# Patient Record
Sex: FEMALE | Race: WHITE | NOT HISPANIC OR LATINO | Employment: OTHER | URBAN - METROPOLITAN AREA
[De-identification: names, ages, dates, MRNs, and addresses within clinical notes are randomized per-mention and may not be internally consistent; named-entity substitution may affect disease eponyms.]

---

## 2021-11-30 ENCOUNTER — HOSPITAL ENCOUNTER (OUTPATIENT)
Facility: CLINIC | Age: 66
Setting detail: OBSERVATION
Discharge: HOME | End: 2021-12-01
Attending: INTERNAL MEDICINE | Admitting: INTERNAL MEDICINE
Payer: MEDICARE

## 2021-11-30 ENCOUNTER — APPOINTMENT (OUTPATIENT)
Dept: GENERAL RADIOLOGY | Facility: CLINIC | Age: 66
End: 2021-11-30
Attending: INTERNAL MEDICINE
Payer: MEDICARE

## 2021-11-30 DIAGNOSIS — R07.9 CHEST PAIN, UNSPECIFIED TYPE: ICD-10-CM

## 2021-11-30 LAB
ANION GAP SERPL CALCULATED.3IONS-SCNC: 5 MMOL/L (ref 3–14)
BASOPHILS # BLD AUTO: 0 10E3/UL (ref 0–0.2)
BASOPHILS NFR BLD AUTO: 0 %
BUN SERPL-MCNC: 29 MG/DL (ref 7–30)
CALCIUM SERPL-MCNC: 9.4 MG/DL (ref 8.5–10.1)
CHLORIDE BLD-SCNC: 107 MMOL/L (ref 94–109)
CO2 SERPL-SCNC: 27 MMOL/L (ref 20–32)
CREAT SERPL-MCNC: 0.87 MG/DL (ref 0.52–1.04)
D DIMER PPP FEU-MCNC: 0.33 UG/ML FEU (ref 0–0.5)
EOSINOPHIL # BLD AUTO: 0.1 10E3/UL (ref 0–0.7)
EOSINOPHIL NFR BLD AUTO: 1 %
ERYTHROCYTE [DISTWIDTH] IN BLOOD BY AUTOMATED COUNT: 12.8 % (ref 10–15)
GFR SERPL CREATININE-BSD FRML MDRD: 69 ML/MIN/1.73M2
GLUCOSE BLD-MCNC: 94 MG/DL (ref 70–99)
HCT VFR BLD AUTO: 39 % (ref 35–47)
HGB BLD-MCNC: 12.6 G/DL (ref 11.7–15.7)
IMM GRANULOCYTES # BLD: 0 10E3/UL
IMM GRANULOCYTES NFR BLD: 0 %
LYMPHOCYTES # BLD AUTO: 1.6 10E3/UL (ref 0.8–5.3)
LYMPHOCYTES NFR BLD AUTO: 30 %
MCH RBC QN AUTO: 31.1 PG (ref 26.5–33)
MCHC RBC AUTO-ENTMCNC: 32.3 G/DL (ref 31.5–36.5)
MCV RBC AUTO: 96 FL (ref 78–100)
MONOCYTES # BLD AUTO: 0.4 10E3/UL (ref 0–1.3)
MONOCYTES NFR BLD AUTO: 7 %
NEUTROPHILS # BLD AUTO: 3.2 10E3/UL (ref 1.6–8.3)
NEUTROPHILS NFR BLD AUTO: 62 %
NRBC # BLD AUTO: 0 10E3/UL
NRBC BLD AUTO-RTO: 0 /100
PLATELET # BLD AUTO: 243 10E3/UL (ref 150–450)
POTASSIUM BLD-SCNC: 3.9 MMOL/L (ref 3.4–5.3)
RBC # BLD AUTO: 4.05 10E6/UL (ref 3.8–5.2)
SARS-COV-2 RNA RESP QL NAA+PROBE: NEGATIVE
SODIUM SERPL-SCNC: 139 MMOL/L (ref 133–144)
TROPONIN I SERPL HS-MCNC: 8 NG/L
TROPONIN I SERPL HS-MCNC: 9 NG/L
WBC # BLD AUTO: 5.2 10E3/UL (ref 4–11)

## 2021-11-30 PROCEDURE — 36415 COLL VENOUS BLD VENIPUNCTURE: CPT | Performed by: INTERNAL MEDICINE

## 2021-11-30 PROCEDURE — C9803 HOPD COVID-19 SPEC COLLECT: HCPCS

## 2021-11-30 PROCEDURE — 93005 ELECTROCARDIOGRAM TRACING: CPT | Mod: 76

## 2021-11-30 PROCEDURE — 85025 COMPLETE CBC W/AUTO DIFF WBC: CPT | Performed by: INTERNAL MEDICINE

## 2021-11-30 PROCEDURE — 71046 X-RAY EXAM CHEST 2 VIEWS: CPT

## 2021-11-30 PROCEDURE — 80061 LIPID PANEL: CPT | Performed by: INTERNAL MEDICINE

## 2021-11-30 PROCEDURE — G0378 HOSPITAL OBSERVATION PER HR: HCPCS

## 2021-11-30 PROCEDURE — 87635 SARS-COV-2 COVID-19 AMP PRB: CPT | Performed by: INTERNAL MEDICINE

## 2021-11-30 PROCEDURE — 99285 EMERGENCY DEPT VISIT HI MDM: CPT | Mod: 25

## 2021-11-30 PROCEDURE — 80048 BASIC METABOLIC PNL TOTAL CA: CPT | Performed by: INTERNAL MEDICINE

## 2021-11-30 PROCEDURE — 84484 ASSAY OF TROPONIN QUANT: CPT | Mod: 91 | Performed by: INTERNAL MEDICINE

## 2021-11-30 PROCEDURE — 85379 FIBRIN DEGRADATION QUANT: CPT | Performed by: INTERNAL MEDICINE

## 2021-11-30 PROCEDURE — 93005 ELECTROCARDIOGRAM TRACING: CPT

## 2021-11-30 RX ORDER — ONDANSETRON 2 MG/ML
4 INJECTION INTRAMUSCULAR; INTRAVENOUS EVERY 6 HOURS PRN
Status: DISCONTINUED | OUTPATIENT
Start: 2021-11-30 | End: 2021-12-01 | Stop reason: HOSPADM

## 2021-11-30 RX ORDER — LEVOTHYROXINE SODIUM 88 UG/1
88 TABLET ORAL
Status: DISCONTINUED | OUTPATIENT
Start: 2021-12-01 | End: 2021-12-01 | Stop reason: HOSPADM

## 2021-11-30 RX ORDER — VENLAFAXINE HYDROCHLORIDE 150 MG/1
150 CAPSULE, EXTENDED RELEASE ORAL
Status: DISCONTINUED | OUTPATIENT
Start: 2021-12-01 | End: 2021-12-01 | Stop reason: HOSPADM

## 2021-11-30 RX ORDER — MIRTAZAPINE 15 MG/1
15 TABLET, FILM COATED ORAL AT BEDTIME
Status: DISCONTINUED | OUTPATIENT
Start: 2021-11-30 | End: 2021-12-01 | Stop reason: HOSPADM

## 2021-11-30 RX ORDER — ASPIRIN 81 MG/1
81 TABLET ORAL DAILY
Status: DISCONTINUED | OUTPATIENT
Start: 2021-11-30 | End: 2021-12-01 | Stop reason: HOSPADM

## 2021-11-30 RX ORDER — ACETAMINOPHEN 650 MG/1
650 SUPPOSITORY RECTAL EVERY 6 HOURS PRN
Status: DISCONTINUED | OUTPATIENT
Start: 2021-11-30 | End: 2021-12-01 | Stop reason: HOSPADM

## 2021-11-30 RX ORDER — BUSPIRONE HYDROCHLORIDE 15 MG/1
15 TABLET ORAL 3 TIMES DAILY
Status: DISCONTINUED | OUTPATIENT
Start: 2021-12-01 | End: 2021-12-01 | Stop reason: HOSPADM

## 2021-11-30 RX ORDER — ONDANSETRON 4 MG/1
4 TABLET, ORALLY DISINTEGRATING ORAL EVERY 6 HOURS PRN
Status: DISCONTINUED | OUTPATIENT
Start: 2021-11-30 | End: 2021-12-01 | Stop reason: HOSPADM

## 2021-11-30 RX ORDER — ACETAMINOPHEN 325 MG/1
650 TABLET ORAL EVERY 6 HOURS PRN
Status: DISCONTINUED | OUTPATIENT
Start: 2021-11-30 | End: 2021-12-01 | Stop reason: HOSPADM

## 2021-11-30 ASSESSMENT — ENCOUNTER SYMPTOMS
FEVER: 0
SHORTNESS OF BREATH: 1
GASTROINTESTINAL NEGATIVE: 1

## 2021-11-30 ASSESSMENT — MIFFLIN-ST. JEOR: SCORE: 1193.6

## 2021-11-30 NOTE — ED TRIAGE NOTES
Pt arrives to ED she reports she took dog out for a walk while walkng she developed pain in center of chest. Pain resolved after sitting down reports discomfort now. No hx of cardiac. Denies nausea vomiting. Yesterday pt felt fatigued had negative covid test.

## 2021-11-30 NOTE — ED PROVIDER NOTES
"  History   Chief Complaint:  Chest Pain       The history is provided by the patient and the spouse.      Sybil Curry is a 66 year old female with history of tobacco use who presents with chest pain.Yesteday Sybil felt more fatigued than usual. She had a Covid-19 test which came back as negative. Patent reports that she was walking her dog this evening around 1700 when she developed central chest pain with shortness of breath lasting about 10-15 minutes. She sat down afterwards which seemed to help her pain. However, the pain continued intermittently and is now causing her discomfort. Denies hematemesis. No GI symptoms. No fever, loss of taste, or anosmia. She has a family history of heart disease. No history of DVT or PE. She is Covid-19 vaccinated.    Review of Systems   Constitutional: Negative for fever.   Respiratory: Positive for shortness of breath.    Cardiovascular: Positive for chest pain.   Gastrointestinal: Negative.    All other systems reviewed and are negative.      Allergies:  No Known Allergies    Medications:  The patient is currently on no regular medications.    Past Medical History:     The patient denies any significant past medical history.    Social History:  Patient accompanied by spouse  PCP: No primary care provider on file.    Physical Exam     Patient Vitals for the past 24 hrs:   BP Temp Temp src Pulse Resp SpO2 Height Weight   11/30/21 1915 133/82 -- -- 61 22 98 % -- --   11/30/21 1706 (!) 158/105 97.7  F (36.5  C) Temporal 86 16 98 % 1.651 m (5' 5\") 65.8 kg (145 lb)       Physical Exam  Constitutional:       Comments: Pleasant and cooperative   HENT:      Mouth/Throat:      Pharynx: No posterior oropharyngeal erythema.   Eyes:      Conjunctiva/sclera: Conjunctivae normal.   Cardiovascular:      Rate and Rhythm: Normal rate and regular rhythm.      Heart sounds: Normal heart sounds.   Pulmonary:      Effort: Pulmonary effort is normal.      Breath sounds: Normal breath sounds. "   Abdominal:      General: Bowel sounds are normal. There is no distension.      Palpations: Abdomen is soft.      Tenderness: There is no abdominal tenderness. There is no guarding or rebound.   Musculoskeletal:         General: Normal range of motion.      Cervical back: Neck supple.   Skin:     General: Skin is warm and dry.   Neurological:      Mental Status: She is alert.         Emergency Department Course   EKG  ECG obtained at 1735, ECG read at 1829  Normal sinus rhythm. Rightward axis. Borderline ECG.   No previous ECG to compare.  Rate 68 bpm. IL interval 132 ms. QRS duration 90 ms. QT/QTc 422/448 ms. P-R-T axes 69 109 70.     ECG  ECG taken at 2012, ECG read at 2054  Sinus tachycardia. Rightward axis. Borderline ECG.   No significant changes as compared to prior, dated 11/30/21.  Rate 58 bpm. IL interval 138 ms. QRS duration 88 ms. QT/QTc 476/467 ms. P-R-T axes 68 96 74.     Imaging:  Chest XR,  PA & LAT   Final Result   IMPRESSION: Negative chest.        Report per radiology    Laboratory:  Labs Ordered and Resulted from Time of ED Arrival to Time of ED Departure   BASIC METABOLIC PANEL - Normal       Result Value    Sodium 139      Potassium 3.9      Chloride 107      Carbon Dioxide (CO2) 27      Anion Gap 5      Urea Nitrogen 29      Creatinine 0.87      Calcium 9.4      Glucose 94      GFR Estimate 69     TROPONIN I - Normal    Troponin I High Sensitivity 8     D DIMER QUANTITATIVE - Normal    D-Dimer Quantitative 0.33     TROPONIN I - Normal    Troponin I High Sensitivity 9     CBC WITH PLATELETS AND DIFFERENTIAL    WBC Count 5.2      RBC Count 4.05      Hemoglobin 12.6      Hematocrit 39.0      MCV 96      MCH 31.1      MCHC 32.3      RDW 12.8      Platelet Count 243      % Neutrophils 62      % Lymphocytes 30      % Monocytes 7      % Eosinophils 1      % Basophils 0      % Immature Granulocytes 0      NRBCs per 100 WBC 0      Absolute Neutrophils 3.2      Absolute Lymphocytes 1.6      Absolute  Monocytes 0.4      Absolute Eosinophils 0.1      Absolute Basophils 0.0      Absolute Immature Granulocytes 0.0      Absolute NRBCs 0.0     COVID-19 VIRUS (CORONAVIRUS) BY PCR   LIPID REFLEX TO DIRECT LDL PANEL        Emergency Department Course:  Reviewed:  I reviewed nursing notes, vitals and past medical history    Assessments/Consults:  ED Course as of 11/30/21 2333 Tue Nov 30, 2021   1802 Obtained history and examined the patient as noted above   1926 Updated the patient   2207 Consulted with Dr. Maldonado, Hospitalist, regarding the patients history and presentation in the emergency department.       Interventions:  Medications   aspirin EC tablet 81 mg (has no administration in time range)       Disposition:  The patient was admitted to the hospital under the care of Dr. Maldonado.     Impression & Plan     CMS Diagnoses: None    Medical Decision Making:    Sybil Curry is a 66 year old female who presents to the emergency department with a worrisome history of exertional chest pain with associated symptoms today.  Her EKG is normal x2 and troponin is negative x2.  No other etiology is definitely apparent.  Given her high risk presentation we will keep her on observation pending further evaluation and management.      Diagnosis:    ICD-10-CM    1. Chest pain, unspecified type  R07.9        Discharge Medications:  New Prescriptions    No medications on file       Scribe Disclosure:  I, Real Gutierrez, am serving as a scribe at 5:41 PM on 11/30/2021 to document services personally performed by Kia Magallanes MD based on my observations and the provider's statements to me.            Kia Magallanes MD  11/30/21 3904

## 2021-12-01 ENCOUNTER — APPOINTMENT (OUTPATIENT)
Dept: CARDIOLOGY | Facility: CLINIC | Age: 66
End: 2021-12-01
Attending: INTERNAL MEDICINE
Payer: MEDICARE

## 2021-12-01 VITALS
HEART RATE: 62 BPM | DIASTOLIC BLOOD PRESSURE: 85 MMHG | RESPIRATION RATE: 16 BRPM | OXYGEN SATURATION: 95 % | WEIGHT: 146.5 LBS | TEMPERATURE: 97.7 F | HEIGHT: 66 IN | BODY MASS INDEX: 23.54 KG/M2 | SYSTOLIC BLOOD PRESSURE: 132 MMHG

## 2021-12-01 LAB
ATRIAL RATE - MUSE: 58 BPM
ATRIAL RATE - MUSE: 68 BPM
CHOLEST SERPL-MCNC: 203 MG/DL
DIASTOLIC BLOOD PRESSURE - MUSE: NORMAL MMHG
DIASTOLIC BLOOD PRESSURE - MUSE: NORMAL MMHG
HDLC SERPL-MCNC: 59 MG/DL
INTERPRETATION ECG - MUSE: NORMAL
INTERPRETATION ECG - MUSE: NORMAL
LDLC SERPL CALC-MCNC: 122 MG/DL
NONHDLC SERPL-MCNC: 144 MG/DL
P AXIS - MUSE: 68 DEGREES
P AXIS - MUSE: 69 DEGREES
PR INTERVAL - MUSE: 132 MS
PR INTERVAL - MUSE: 138 MS
QRS DURATION - MUSE: 88 MS
QRS DURATION - MUSE: 90 MS
QT - MUSE: 422 MS
QT - MUSE: 476 MS
QTC - MUSE: 448 MS
QTC - MUSE: 467 MS
R AXIS - MUSE: 109 DEGREES
R AXIS - MUSE: 96 DEGREES
SYSTOLIC BLOOD PRESSURE - MUSE: NORMAL MMHG
SYSTOLIC BLOOD PRESSURE - MUSE: NORMAL MMHG
T AXIS - MUSE: 70 DEGREES
T AXIS - MUSE: 74 DEGREES
TRIGL SERPL-MCNC: 111 MG/DL
TROPONIN I SERPL HS-MCNC: 10 NG/L
TROPONIN I SERPL HS-MCNC: 8 NG/L
VENTRICULAR RATE- MUSE: 58 BPM
VENTRICULAR RATE- MUSE: 68 BPM

## 2021-12-01 PROCEDURE — 93017 CV STRESS TEST TRACING ONLY: CPT

## 2021-12-01 PROCEDURE — 36415 COLL VENOUS BLD VENIPUNCTURE: CPT | Performed by: INTERNAL MEDICINE

## 2021-12-01 PROCEDURE — 99217 PR OBSERVATION CARE DISCHARGE: CPT | Performed by: PHYSICIAN ASSISTANT

## 2021-12-01 PROCEDURE — 93016 CV STRESS TEST SUPVJ ONLY: CPT | Performed by: INTERNAL MEDICINE

## 2021-12-01 PROCEDURE — G0378 HOSPITAL OBSERVATION PER HR: HCPCS

## 2021-12-01 PROCEDURE — 93350 STRESS TTE ONLY: CPT | Mod: TC

## 2021-12-01 PROCEDURE — 93350 STRESS TTE ONLY: CPT | Mod: 26 | Performed by: INTERNAL MEDICINE

## 2021-12-01 PROCEDURE — 93321 DOPPLER ECHO F-UP/LMTD STD: CPT | Mod: 26 | Performed by: INTERNAL MEDICINE

## 2021-12-01 PROCEDURE — 250N000013 HC RX MED GY IP 250 OP 250 PS 637: Performed by: INTERNAL MEDICINE

## 2021-12-01 PROCEDURE — 93018 CV STRESS TEST I&R ONLY: CPT | Performed by: INTERNAL MEDICINE

## 2021-12-01 PROCEDURE — 93325 DOPPLER ECHO COLOR FLOW MAPG: CPT | Mod: 26 | Performed by: INTERNAL MEDICINE

## 2021-12-01 PROCEDURE — 84484 ASSAY OF TROPONIN QUANT: CPT | Performed by: INTERNAL MEDICINE

## 2021-12-01 RX ORDER — VENLAFAXINE HYDROCHLORIDE 150 MG/1
150 CAPSULE, EXTENDED RELEASE ORAL DAILY
COMMUNITY

## 2021-12-01 RX ORDER — MIRTAZAPINE 15 MG/1
15 TABLET, FILM COATED ORAL AT BEDTIME
COMMUNITY

## 2021-12-01 RX ORDER — TRAZODONE HYDROCHLORIDE 50 MG/1
25-100 TABLET, FILM COATED ORAL AT BEDTIME
COMMUNITY

## 2021-12-01 RX ORDER — LEVOTHYROXINE SODIUM 88 UG/1
88 TABLET ORAL DAILY
COMMUNITY

## 2021-12-01 RX ORDER — BUSPIRONE HYDROCHLORIDE 15 MG/1
15 TABLET ORAL 3 TIMES DAILY
COMMUNITY

## 2021-12-01 RX ADMIN — ASPIRIN 81 MG: 81 TABLET, COATED ORAL at 08:40

## 2021-12-01 RX ADMIN — ASPIRIN 81 MG: 81 TABLET, COATED ORAL at 02:21

## 2021-12-01 ASSESSMENT — MIFFLIN-ST. JEOR: SCORE: 1216.27

## 2021-12-01 NOTE — PROGRESS NOTES
ROOM # 208-1    Living Situation (if not independent, order SW consult): Ind with   Facility name:  :      Activity level at baseline: Ind  Activity level on admit: Ind      Patient registered to observation; given Patient Bill of Rights; given the opportunity to ask questions about observation status and their plan of care.  Patient has been oriented to the observation room, bathroom and call light is in place.    Discussed discharge goals and expectations with patient/family.

## 2021-12-01 NOTE — PROGRESS NOTES
PRIMARY DIAGNOSIS: CHEST PAIN  OUTPATIENT/OBSERVATION GOALS TO BE MET BEFORE DISCHARGE:  1. Ruled out acute coronary syndrome (negative or stable Troponin):  Yes-tropx3 neg  2. Pain Status: Pain free.  3. Appropriate provocative testing performed: No- echo stress test to happen  - Stress Test Procedure: Echo stress  - Interpretation of cardiac rhythm per telemetry tech: SB with hr in upper 40s/50s    4. Cleared by Consultants (if applicable):N/A  5. Return to near baseline physical activity: Yes  Discharge Planner Nurse   Safe discharge environment identified: Yes  Barriers to discharge: Yes-stress test       Entered by: Nicolle Lo 12/01/2021 2:55 AM     Please review provider order for any additional goals.   Nurse to notify provider when observation goals have been met and patient is ready for discharge.  Denies chest pain and associated symptoms. On tele- SB in mid 40s and 50s. Up ind. Pt to have stress test today. SL. Lives with . Trops neg.

## 2021-12-01 NOTE — DISCHARGE INSTRUCTIONS
Unsaturated fat is most healthy    Unsaturated fat is the best fat to add to foods when needed .    Unsaturated fats come from plants. They include olive, canola, peanut, corn, avocado, safflower, and sunflower oils.    Liquid (squeezable) margarine is also mostly unsaturated fat.    In moderate amounts, unsaturated fat can even be good for your heart.          Olive oil is a good source of unsaturated fat.      Saturated fat is less healthy    Try not to eat saturated fat. It raises your blood cholesterol levels.    Most saturated fat comes from animals. Foods such as butter, lard, cheese, cream, whole milk, and fatty cuts of meat are high in saturated fat.    Some oils, such as palm and coconut oils, are also saturated fats.     Trans fat is least healthy    Also try not to eat trans fat whenever possible. Even if it's not listed on the food label, look for it in the ingredients in the form of hydrogenated or partially hydrogenated oils.    This is found in snack foods, shortening, French fries, and stick margarines.     Other ways to add flavor    Sprinkle herbs on fish, chicken, and meat, and in soups.    Try herbs, lemon juice, or flavored vinegar on vegetables.    Add chopped onions, garlic, and peppers to flavor beans and rice.    Try the following ways to cook lean meats, fish, or poultry: bake, broil, roast, stew, or stir-rizvi.

## 2021-12-01 NOTE — H&P
Admitted: 11/30/2021    CHIEF COMPLAINT:  Chest pain.    HISTORY OF PRESENT ILLNESS:  Ivette is a 66-year-old female with a past medical history significant for depression, generalized anxiety disorder, hypothyroidism, who presented to the Emergency Room with a complaint of chest pain.  The patient reported that she was walking her dog this evening around 1700 hours when she developed a central chest pain.  It was a squeezing and pressure-like.  There was no radiation to her shoulders or arms.  There was no associated dizziness, palpitation or cough.  She complained of mild shortness of breath.  The pain lasted 10-15 minutes and it improved.  Sitting help with the pain.  Despite that, she continued to have intermittent symptoms of chest discomfort and came to the Emergency Room.  She has no cough, no runny nose, no sore throat.  No muscle aches.  She has no fever or chills.  She felt more fatigued yesterday and had a COVID test, which was negative.  The patient denied any previous history of thromboembolism.  She is vaccinated for COVID-19.    PAST MEDICAL HISTORY:    1.  Depression.  2.  Generalized anxiety disorder.  3.  Hypothyroidism.  4.  Hyperlipidemia.    PAST SURGICAL HISTORY:  None reported.    HOME MEDICATIONS:    1.  BuSpar.    2.  Effexor.  3.  Trazodone.  4.  Remeron.  5.  Levothyroxine.      She is to be reviewed by pharmacy and reconciled.    ALLERGIES:  Reported in Care Everywhere to CELEX DUE TO PROLONGED QTC.    SOCIAL HISTORY:  The patient lives in [place].  She is retired.  She quit smoking about 25 years ago.  She does not drink alcohol.  She does not use illicit drugs.  She is very active.    REVIEW OF SYSTEMS:  Ten points reviewed, all are negative except those mentioned in history of present illness.    PHYSICAL EXAMINATION:    GENERAL:  The patient is awake, alert, oriented, not in any form of distress.  No chest pain currently.  VITAL SIGNS:  Blood pressure 133/82, pulse rate 61, temperature  97.7, oxygen saturation 98% on room air.  HEENT:  Pink nonicteric.  Extraocular muscle movement intact.  NECK:  Supple.  No JVD, no thyromegaly.  CHEST:  Good air entry bilaterally.  No wheezing, crackles or rales.  CARDIOVASCULAR SYSTEM:  S1, S2 regular.  No gallop or murmur.  ABDOMEN:  Soft, nontender, nondistended, positive bowel sounds.  EXTREMITIES:  No edema, cyanosis, or clubbing.  NEUROLOGIC:  No focal neurologic deficit.  Cranial nerves grossly intact.  PSYCHIATRIC:  Normal mood and affect, calm and quiet, cooperative and pleasant.    DIAGNOSTIC TESTS OF INTEREST:  Electrolytes are all normal.  Troponin x2 negative.  Glucose 94.  WBC 5.2, hemoglobin 12.6, platelets 243.  D-dimer is 0.33.      Chest x-ray, no acute abnormality.      EKG showed normal sinus rhythm at 68 beats per minute.  QTc is 448.    Repeat EKG later on showed sinus bradycardia at 58 beats per minute.    ASSESSMENT AND PLAN:  Sybil is a 66-year-old female with past medical history of general anxiety disorder, depression, hypothyroidism, who presents to the Emergency Room with chest pain, epigastric and substernal, without any radiation, resolved with rest and being admitted under observation for further evaluation.    1.  Chest pain:  She is very active at baseline and never experienced these symptoms before.  It is not clear if her underlying anxiety is contributing to this.  Troponin x2 is negative. EKG, non ischemic.  The patient will be admitted under observation and she is getting aspirin 81 mg x 1 now.  We will trend troponin.  She will have an exercise stress echocardiogram in the morning.  If it is negative, she can be discharged without any further workup.  2.  Generalized anxiety disorder and depression:  Continue [id number]-gydzmchxa medications including buspirone, Effexor, trazodone, and Remeron.  3.  Hypothyroidism:  The patient will be on levothyroxine 88 mcg p.o. daily.      I discussed with the patient at length the plan  of care.  All her questions and concerns were addressed and she showed understanding.  The patient is full code.  She is being admitted under observation.  Expect her to be discharged tomorrow after stress echocardiogram if it is normal.     Geovany Maldonado MD        D: 2021   T: 2021   MT: [id number]    Name:     FELICITY REHMAN  MRN:      [id number]        Account:     [id number]   :      1955           Admitted:    2021       Document: [id number]

## 2021-12-01 NOTE — ED NOTES
"[facility]  ED Nurse Handoff Report    Sybil Curry is a 66 year old female   ED Chief complaint: Chest Pain  . ED Diagnosis:   Final diagnoses:   Chest pain, unspecified type     Allergies: No Known Allergies    Code Status: Full Code  Activity level - Baseline/Home:  Independent. Activity Level - Current:   Independent. Lift room needed: No. Bariatric: No   Needed: No   Isolation: No. Infection: Not Applicable.     Vital Signs:   Vitals:    11/30/21 1706 11/30/21 1915 12/01/21 0000   BP: (!) 158/105 133/82 132/81   Pulse: 86 61 60   Resp: 16 22 17   Temp: 97.7  F (36.5  C)     TempSrc: Temporal     SpO2: 98% 98% 96%   Weight: 65.8 kg (145 lb)     Height: 1.651 m (5' 5\")         Cardiac Rhythm:  ,      Pain level:    Patient confused: No. Patient Falls Risk: No.   Elimination Status: Has voided   Patient Report - Initial Complaint: chest pain. Focused Assessment:    00:01 Cardiac Cardiac - Cardiac WDL: .WDL except; chest pain (chest pain described as aching; no hx of cardiac hx; pain is intermittent)        Tests Performed:   Labs Ordered and Resulted from Time of ED Arrival to Time of ED Departure   BASIC METABOLIC PANEL - Normal       Result Value    Sodium 139      Potassium 3.9      Chloride 107      Carbon Dioxide (CO2) 27      Anion Gap 5      Urea Nitrogen 29      Creatinine 0.87      Calcium 9.4      Glucose 94      GFR Estimate 69     TROPONIN I - Normal    Troponin I High Sensitivity 8     D DIMER QUANTITATIVE - Normal    D-Dimer Quantitative 0.33     TROPONIN I - Normal    Troponin I High Sensitivity 9     COVID-19 VIRUS (CORONAVIRUS) BY PCR - Normal    SARS CoV2 PCR Negative     CBC WITH PLATELETS AND DIFFERENTIAL    WBC Count 5.2      RBC Count 4.05      Hemoglobin 12.6      Hematocrit 39.0      MCV 96      MCH 31.1      MCHC 32.3      RDW 12.8      Platelet Count 243      % Neutrophils 62      % Lymphocytes 30      % Monocytes 7      % Eosinophils 1      % Basophils 0      % " Immature Granulocytes 0      NRBCs per 100 WBC 0      Absolute Neutrophils 3.2      Absolute Lymphocytes 1.6      Absolute Monocytes 0.4      Absolute Eosinophils 0.1      Absolute Basophils 0.0      Absolute Immature Granulocytes 0.0      Absolute NRBCs 0.0     LIPID REFLEX TO DIRECT LDL PANEL     Chest XR,  PA & LAT   Final Result   IMPRESSION: Negative chest.       Abnormal Results: see above.   Treatments provided: see mar  Family Comments:   OBS brochure/video discussed/provided to patient:  Yes  ED Medications:   Medications   mirtazapine (REMERON) tablet 15 mg (has no administration in time range)   busPIRone (BUSPAR) tablet 15 mg (has no administration in time range)   levothyroxine (SYNTHROID/LEVOTHROID) tablet 88 mcg (has no administration in time range)   venlafaxine (EFFEXOR-XR) 24 hr capsule 150 mg (has no administration in time range)   melatonin tablet 1 mg (has no administration in time range)   ondansetron (ZOFRAN-ODT) ODT tab 4 mg (has no administration in time range)     Or   ondansetron (ZOFRAN) injection 4 mg (has no administration in time range)   acetaminophen (TYLENOL) tablet 650 mg (has no administration in time range)     Or   acetaminophen (TYLENOL) Suppository 650 mg (has no administration in time range)   aspirin EC tablet 81 mg (has no administration in time range)     Drips infusing:  No  For the majority of the shift, the patient's behavior Green. Interventions performed were n/a.    Sepsis treatment initiated: No     Patient tested for COVID 19 prior to admission: YES    ED Nurse Name/Phone Number: Javier Huffman RN,   12:48 AM    RECEIVING UNIT ED HANDOFF REVIEW    Above ED Nurse Handoff Report was reviewed: yes  Reviewed by: Nicolle Lo RN on December 1, 2021 at 1:29 AM

## 2021-12-01 NOTE — PLAN OF CARE
Patient's After Visit Summary was reviewed with patient and/or spouse.   Patient verbalized understanding of After Visit Summary, recommended follow up and was given an opportunity to ask questions.   Discharge medications sent home with patient/family: No   Discharged with spouse    OBSERVATION patient END time: 11:40

## 2021-12-01 NOTE — PLAN OF CARE
PRIMARY DIAGNOSIS: CHEST PAIN  OUTPATIENT/OBSERVATION GOALS TO BE MET BEFORE DISCHARGE:  1. Ruled out acute coronary syndrome (negative or stable Troponin):  Yes  2. Pain Status: Pain free.  3. Appropriate provocative testing performed: Yes  - Stress Test Procedure: Exercise stress test  - Interpretation of cardiac rhythm per telemetry tech: SB/SR    4. Cleared by Consultants (if applicable):N/A  5. Return to near baseline physical activity: Yes  Discharge Planner Nurse   Safe discharge environment identified: Yes  Barriers to discharge: Yes- awaiting stress test results       Entered by: Lorena Douglas 12/01/2021 10:25 AM     Please review provider order for any additional goals.   Nurse to notify provider when observation goals have been met and patient is ready for discharge.

## 2021-12-03 NOTE — DISCHARGE SUMMARY
[facility]    Discharge Summary  Hospitalist    Date of Admission:  11/30/2021  Date of Discharge:  12/3/2021  Discharging Provider: Samantha Grigsby PA-C  Date of Service (when I saw the patient): 12/03/21    Discharge Diagnoses   Chest pain  Hyperlipidemia     History of Present Illness   Ivette is a 66-year-old female with a past medical history significant for depression, generalized anxiety disorder, hypothyroidism, who presented to the Emergency Room with a complaint of chest pain.  The patient reported that she was walking her dog this evening around 1700 hours when she developed a central chest pain.  It was a squeezing and pressure-like.  There was no radiation to her shoulders or arms.  There was no associated dizziness, palpitation or cough.  She complained of mild shortness of breath.  The pain lasted 10-15 minutes and it improved.  Sitting help with the pain.  Despite that, she continued to have intermittent symptoms of chest discomfort and came to the Emergency Room.      Please see admitting H & P  By Geovany Maldonado MD, on 11/30/2021 for full details of the encounter.     Hospital Course   Sybil is a 66-year-old female with past medical history of general anxiety disorder, depression, hypothyroidism, who presents to the Emergency Room with chest pain, epigastric and substernal, without any radiation, resolved with rest and being admitted under observation for further evaluation.    1.  Chest pain:  non cardiac.   She is very active at baseline and never experienced these symptoms before.  It is not clear if her underlying anxiety is contributing to this. Serial Tn I negative, EKG non ischemic.  was admitted under observation and she is getting aspirin 81 mg x 1 now.    Underwent exercise stress echo that showed no evidence of stress-induced ischemic changes, LVEF greater than 70% achieved a target heart rate of 130 bpm.  As part of cardiac work-up lipid panel was updated and  "showed an elevated LDL, discussed this with patient who presents to make dietary changes and follow-up with recheck with primary.       Pending Results   None.    Code Status   Full Code       Primary Care Physician   Selma Yanez      INTERVAL HISTORY  Assumed care  No recurrent CP overnight. No dyspnea. No cough or fever. Denies symptoms after eating.   Telemetry with NSR.           /85 (BP Location: Right arm)   Pulse 62   Temp 97.7  F (36.5  C) (Oral)   Resp 16   Ht 1.676 m (5' 6\")   Wt 66.5 kg (146 lb 8 oz)   SpO2 95%   BMI 23.65 kg/m      Constitutional: Awake, alert, no apparent distress  Respiratory:  Normal work of breathing. Lungs clear to auscultation bilaterally, no crackles or wheezing.  Cardiovascular: Regular rate and rhythm, normal S1 and S2, and no murmur appreciated.   GI: Bowel sounds present. soft, non-distended, non-tender.   Skin/Integument: Warm, dry. no peripheral edema.  Neuro: No focal deficits. Moving all extremities with normal strength. Coordination and sensation grossly intact. Speech clear.   Psych: Appropriate affect.        Discharge Disposition   Discharged to home  Condition at discharge: Stable    Consultations This Hospital Stay   None    Time Spent on this Encounter   ISamantha PA-C, personally saw the patient today and spent greater than 30 minutes discharging this patient.    Discharge Orders      Reason for your hospital stay    You were admitted to the observation unit for chest pain. Your heart was monitored overnight with no abnormal findings. Your cardiac enzymes were negative for heart attack. You had a stress echocardiogram that did not show evidence of heart disease so we do not believe your chest pain was related to your heart. Other possibilities include reflux, stress, and musculoskeletal strain. We recommend you follow up with your primary doctor if you continue to have pain.     Follow-up and recommended labs and tests     1. Follow up with " primary care provider, Selma Yanez, within 7-14 days for hospital follow- up.  The following labs/tests are recommended: recheck lipid panel in about 8 weeks time or as directed by your primary care provider.     Activity    Your activity upon discharge: activity as tolerated     Diet    Follow this diet upon discharge: Orders Placed This Encounter      Regular Diet Adult     Discharge Medications   Discharge Medication List as of 2021 11:29 AM      CONTINUE these medications which have NOT CHANGED    Details   albuterol (PROAIR HFA/PROVENTIL HFA/VENTOLIN HFA) 108 (90 BASE) MCG/ACT Inhaler Inhale 2 puffs into the lungs every 6 hours as needed for shortness of breath / dyspnea or wheezing, Historical      busPIRone (BUSPAR) 15 MG tablet Take 15 mg by mouth 3 times daily, Historical      levothyroxine (SYNTHROID/LEVOTHROID) 88 MCG tablet Take 88 mcg by mouth daily, Historical      mirtazapine (REMERON) 15 MG tablet Take 15 mg by mouth At Bedtime, Historical      traZODone (DESYREL) 50 MG tablet Take  mg by mouth At Bedtime, Historical      venlafaxine (EFFEXOR-XR) 150 MG 24 hr capsule Take 150 mg by mouth daily, Historical           Allergies   No Known Allergies  Data   Results for orders placed or performed during the hospital encounter of 21   Chest XR,  PA & LAT    Narrative    EXAM: XR CHEST 2 VW  LOCATION: [facility]  DATE/TIME: 2021 8:50 PM    INDICATION: Chest pain  COMPARISON: None.      Impression    IMPRESSION: Negative chest.   Echo Stress Echocardiogram    Narrative    [id number]  [id number]  [id number]  [id number]^MATILDA^NAMITA^[facility]  Echocardiography Laboratory  [address] [place] [address]     Name: FELICITY REHMAN  MRN: [id number]  : 1955  Study Date: 2021 07:30 AM  Age: 66 yrs  Gender: Female  Patient Location: [facility]  Reason For Study: Chest Pain  History: Family History of CAD  Ordering Physician:  NAMITA GREY  Referring Physician: Selma Good  Performed By: Ceferino Green RDCS     BSA: 1.7 m2  Height: 66 in  Weight: 146 lb  HR: 86  BP: 128/88 mmHg  Medications: No Cardiac  ______________________________________________________________________________  Procedure  Stress Echo Complete.  ______________________________________________________________________________  Interpretation Summary  This was a normal stress echocardiogram with no evidence of stress-induced  ischemia.  ______________________________________________________________________________  Stress  This study was stopped as the patient achieved an adequate exercise effort for  a diagnostic study.  There was a borderline hypertensive BP response to exercise.  Patient reported central chest ache at rest that did not get worse with  exercise.  Target Heart Rate was achieved.  The EKG portion of this stress test was negative for inducible ischemia (see  echo results below).  A moderately-high workload was achieved.  Normal left ventricular function and wall motion at rest and post-stress.  This was a normal stress echocardiogram with no evidence of stress-induced  ischemia.  The visual ejection fraction is >70%.     Baseline  The patient is in normal sinus rhythm.  Normal left ventricular function and wall motion at rest.     Stress Results             Protocol:  Obdulio          Maximum Predicted HR:   153 bpm             Target HR: 130 bpm        % Maximum Predicted HR: 94 %               Stage  DurationHeart Rate  BP             Comment                    (mm:ss)   (bpm)           Stage 1   3:00      123   158/82           Stage 2   3:00      131   172/78           Stage 3   3:00      136   178/76           Stage 4   3:00      144   182/76          RecoveryR  5:00      100   116/78RPP [id number], FAC Above, DUKE 8            Stress Duration:   12:00 mm:ss *        Recovery Time: 5:00 mm:ss         Maximum Stress HR: 144 bpm *            METS:           13     Mitral Valve  There is no mitral regurgitation noted.     Tricuspid Valve  The right ventricular systolic pressure is approximated at 27.5 mmHg plus the  right atrial pressure.     Aortic Valve  The aortic valve is trileaflet. No aortic stenosis is present.  ______________________________________________________________________________  MMode/2D Measurements & Calculations  asc Aorta Diam: 3.5 cm     Doppler Measurements & Calculations  TR max alphonse: 262.0 cm/sec  TR max P.5 mmHg     ______________________________________________________________________________  Report approved by: Anna Subramanian 2021 09:08 AM             Most Recent 3 CBC's:Recent Labs   Lab Test 21  1815   WBC 5.2   HGB 12.6   MCV 96        Most Recent 3 BMP's:Recent Labs   Lab Test 21  1815      POTASSIUM 3.9   CHLORIDE 107   CO2 27   BUN 29   CR 0.87   ANIONGAP 5   DEMETRIUS 9.4   GLC 94       Samantha Grigsby PA-C  [facility]

## 2023-11-04 ENCOUNTER — HEALTH MAINTENANCE LETTER (OUTPATIENT)
Age: 68
End: 2023-11-04

## 2025-03-15 ENCOUNTER — OFFICE VISIT (OUTPATIENT)
Dept: URGENT CARE | Facility: URGENT CARE | Age: 70
End: 2025-03-15
Payer: COMMERCIAL

## 2025-03-15 VITALS
HEART RATE: 81 BPM | DIASTOLIC BLOOD PRESSURE: 75 MMHG | WEIGHT: 157 LBS | OXYGEN SATURATION: 99 % | TEMPERATURE: 97.9 F | BODY MASS INDEX: 25.34 KG/M2 | RESPIRATION RATE: 18 BRPM | SYSTOLIC BLOOD PRESSURE: 118 MMHG

## 2025-03-15 DIAGNOSIS — J01.90 ACUTE SINUSITIS WITH COEXISTING CONDITION, NEED PROPHYLACTIC TREATMENT: Primary | ICD-10-CM

## 2025-03-15 PROCEDURE — 3074F SYST BP LT 130 MM HG: CPT | Performed by: PHYSICIAN ASSISTANT

## 2025-03-15 PROCEDURE — 99203 OFFICE O/P NEW LOW 30 MIN: CPT | Performed by: PHYSICIAN ASSISTANT

## 2025-03-15 PROCEDURE — 3078F DIAST BP <80 MM HG: CPT | Performed by: PHYSICIAN ASSISTANT

## 2025-03-15 RX ORDER — ATORVASTATIN CALCIUM 10 MG/1
10 TABLET, FILM COATED ORAL DAILY
COMMUNITY
Start: 2025-01-16

## 2025-03-15 RX ORDER — QUETIAPINE FUMARATE 25 MG/1
TABLET, FILM COATED ORAL
COMMUNITY
Start: 2023-11-13

## 2025-03-15 RX ORDER — IBUPROFEN 200 MG
200-600 TABLET ORAL
COMMUNITY

## 2025-03-15 RX ORDER — DONEPEZIL HYDROCHLORIDE 10 MG/1
TABLET, FILM COATED ORAL
COMMUNITY
Start: 2024-09-23

## 2025-03-15 NOTE — PROGRESS NOTES
SUBJECTIVE:  Sybil Curry is a 70 year old female who comes in with concerns for possible sinus infection.  Patient states that she has had nasal congestion that seems to be getting worse.  She states that her teeth, face and eyes have pressure.  Has used some saline with minimal relief.  Does have some postnasal drainage but denies any shortness of breath or chest pain.  Does have a history of a few sinus infections.  No history of sinus surgery.  Has used albuterol in the past but not needed.  Has been using over-the-counter meds for symptomatic relief.  Denies any other associated symptoms otherwise at baseline health.    No past medical history on file.  Patient Active Problem List   Diagnosis    Chest pain     Current Outpatient Medications   Medication Sig Dispense Refill    albuterol (PROAIR HFA/PROVENTIL HFA/VENTOLIN HFA) 108 (90 BASE) MCG/ACT Inhaler Inhale 2 puffs into the lungs every 6 hours as needed for shortness of breath / dyspnea or wheezing      atorvastatin (LIPITOR) 10 MG tablet Take 10 mg by mouth daily.      busPIRone (BUSPAR) 15 MG tablet Take 15 mg by mouth 3 times daily      donepezil (ARICEPT) 10 MG tablet Take one half tablet daily for 2 weeks. If symptoms have not improved, increase to 1 tablet daily.      ibuprofen (ADVIL/MOTRIN) 200 MG tablet Take 200-600 mg by mouth.      levothyroxine (SYNTHROID/LEVOTHROID) 88 MCG tablet Take 88 mcg by mouth daily      mirtazapine (REMERON) 15 MG tablet Take 15 mg by mouth At Bedtime      QUEtiapine (SEROQUEL) 25 MG tablet Take by mouth.      venlafaxine (EFFEXOR-XR) 150 MG 24 hr capsule Take 150 mg by mouth daily       No current facility-administered medications for this visit.     Social History     Socioeconomic History    Marital status:      Spouse name: Not on file    Number of children: Not on file    Years of education: Not on file    Highest education level: Not on file   Occupational History    Not on file   Tobacco Use    Smoking  status: Never    Smokeless tobacco: Never   Substance and Sexual Activity    Alcohol use: Not on file    Drug use: Not on file    Sexual activity: Not on file   Other Topics Concern    Not on file   Social History Narrative    Not on file     Social Drivers of Health     Financial Resource Strain: Low Risk  (7/9/2023)    Received from [facility]    Overall Financial Resource Strain (CARDIA)     Difficulty of Paying Living Expenses: Not hard at all   Food Insecurity: No Food Insecurity (1/16/2025)    Received from [facility]    Hunger Vital Sign     Worried About Running Out of Food in the Last Year: Never true     Ran Out of Food in the Last Year: Never true   Transportation Needs: No Transportation Needs (1/16/2025)    Received from [facility]    PRAPARE - Transportation     Lack of Transportation (Medical): No     Lack of Transportation (Non-Medical): No   Physical Activity: Sufficiently Active (8/20/2024)    Received from [facility]    Exercise Vital Sign     Days of Exercise per Week: 6 days     Minutes of Exercise per Session: 50 min   Stress: Stress Concern Present (5/6/2021)    Received from [facility]    [employer] - Occupational Stress Questionnaire     Feeling of Stress : Rather much   Social Connections: Socially Integrated (5/6/2021)    Received from [facility]    Social Connection and Isolation Panel [NHANES]     Frequency of Communication with Friends and Family: Three times a week     Frequency of Social Gatherings with Friends and Family: Once a week     Attends J[employer] Services: 1 to 4 times per year     Active Member of Clubs or Organizations: Yes     Attends Club or Organization Meetings: More than 4 times per year     Marital Status:    Interpersonal Safety: Not At Risk (7/9/2023)    Received from [facility]    Humiliation, Afraid, Rape, and Kick questionnaire     Fear of Current or Ex-Partner: No     Emotionally Abused: No     Physically  Abused: No     Sexually Abused: No   Housing Stability: Low Risk  (1/16/2025)    Received from [facility]    Housing Stability Vital Sign     Unable to Pay for Housing in the Last Year: No     Number of Times Moved in the Last Year: 0     Homeless in the Last Year: No     ROS  negative other than stated above    Exam:  GENERAL APPEARANCE: healthy, alert and no distress  EYES: EOMI,  PERRL  HENT: TMs and canals clear bilaterally.  Oral mucosa moist with no erythema or exudate noted.  Postnasal drainage is present.  Axillary sinus tenderness significant to palpation bilaterally.  NECK: no adenopathy, no asymmetry, masses, or scars and thyroid normal to palpation  RESP: lungs clear to auscultation - no rales, rhonchi or wheezes  CV: regular rates and rhythm, normal S1 S2, no S3 or S4 and no murmur, click or rub -  SKIN: no suspicious lesions or rashes    assessment/plan:  (J01.90) Acute sinusitis with coexisting condition, need prophylactic treatment  (primary encounter diagnosis)  Comment:   Plan: amoxicillin-clavulanate (AUGMENTIN) 875-125 MG         tablet        With continued URI related symptoms now with increasing facial pain and pressure with associated dental pain and eye pain.  Symptoms are consistent with likely sinusitis.  Will treat with Augmentin.  Side effects of medication were discussed.  Advise increase fluids hot packs to face along with steam and nasal saline.  Will take medication with food and advised probiotic.  Red flag signs were discussed return to clinic if symptoms worsen or new symptoms develop.

## 2025-03-22 ENCOUNTER — HEALTH MAINTENANCE LETTER (OUTPATIENT)
Age: 70
End: 2025-03-22